# Patient Record
Sex: FEMALE | Race: WHITE | NOT HISPANIC OR LATINO | Employment: OTHER
[De-identification: names, ages, dates, MRNs, and addresses within clinical notes are randomized per-mention and may not be internally consistent; named-entity substitution may affect disease eponyms.]

---

## 2017-09-24 ENCOUNTER — HEALTH MAINTENANCE LETTER (OUTPATIENT)
Age: 67
End: 2017-09-24

## 2019-11-09 ENCOUNTER — HEALTH MAINTENANCE LETTER (OUTPATIENT)
Age: 69
End: 2019-11-09

## 2020-02-23 ENCOUNTER — HEALTH MAINTENANCE LETTER (OUTPATIENT)
Age: 70
End: 2020-02-23

## 2020-12-06 ENCOUNTER — HEALTH MAINTENANCE LETTER (OUTPATIENT)
Age: 70
End: 2020-12-06

## 2021-04-11 ENCOUNTER — HEALTH MAINTENANCE LETTER (OUTPATIENT)
Age: 71
End: 2021-04-11

## 2021-09-26 ENCOUNTER — HEALTH MAINTENANCE LETTER (OUTPATIENT)
Age: 71
End: 2021-09-26

## 2021-11-21 ENCOUNTER — HEALTH MAINTENANCE LETTER (OUTPATIENT)
Age: 71
End: 2021-11-21

## 2022-05-07 ENCOUNTER — HEALTH MAINTENANCE LETTER (OUTPATIENT)
Age: 72
End: 2022-05-07

## 2023-06-02 ENCOUNTER — HEALTH MAINTENANCE LETTER (OUTPATIENT)
Age: 73
End: 2023-06-02

## 2023-12-02 ENCOUNTER — HEALTH MAINTENANCE LETTER (OUTPATIENT)
Age: 73
End: 2023-12-02

## 2025-01-21 ENCOUNTER — APPOINTMENT (OUTPATIENT)
Dept: URBAN - METROPOLITAN AREA CLINIC 252 | Age: 75
Setting detail: DERMATOLOGY
End: 2025-01-21

## 2025-01-21 VITALS — RESPIRATION RATE: 16 BRPM | HEIGHT: 66.5 IN | WEIGHT: 286.6 LBS

## 2025-01-21 DIAGNOSIS — L81.4 OTHER MELANIN HYPERPIGMENTATION: ICD-10-CM

## 2025-01-21 DIAGNOSIS — L57.0 ACTINIC KERATOSIS: ICD-10-CM

## 2025-01-21 PROCEDURE — 99202 OFFICE O/P NEW SF 15 MIN: CPT | Mod: 25

## 2025-01-21 PROCEDURE — 17000 DESTRUCT PREMALG LESION: CPT

## 2025-01-21 PROCEDURE — OTHER COUNSELING: OTHER

## 2025-01-21 PROCEDURE — OTHER LIQUID NITROGEN: OTHER

## 2025-01-21 ASSESSMENT — LOCATION SIMPLE DESCRIPTION DERM
LOCATION SIMPLE: LEFT CHEEK
LOCATION SIMPLE: LEFT FOREARM
LOCATION SIMPLE: LEFT CHEEK

## 2025-01-21 ASSESSMENT — LOCATION ZONE DERM
LOCATION ZONE: ARM
LOCATION ZONE: FACE
LOCATION ZONE: FACE

## 2025-01-21 ASSESSMENT — LOCATION DETAILED DESCRIPTION DERM
LOCATION DETAILED: LEFT DISTAL DORSAL FOREARM
LOCATION DETAILED: LEFT INFERIOR CENTRAL MALAR CHEEK
LOCATION DETAILED: LEFT INFERIOR CENTRAL MALAR CHEEK

## 2025-01-21 NOTE — PROCEDURE: LIQUID NITROGEN
Detail Level: Detailed
Consent: - Discussed that these are a result of cumulative sun exposure.\\n- Consent was obtained and risks were reviewed prior to procedure today. All questions were answered prior to procedure today.\\n- Risks discussed include but are not limited to pain, crusting, scabbing, blistering, scarring, temporary or permanent darker or lighter pigmentary change, recurrence, incomplete resolution, and infection.
Duration Of Freeze Thaw-Cycle (Seconds): 0
Show Applicator Variable?: Yes
Show Aperture Variable?: No
Post-Care Instructions: - Patient was instructed to avoid picking at any of the treated lesions.
Application Tool (Optional): Liquid Nitrogen Sprayer

## 2025-01-21 NOTE — PROCEDURE: COUNSELING
Patient Specific Counseling (Will Not Stick From Patient To Patient): **discussed PDT- handout given today.
Detail Level: Detailed
Patient Specific Counseling (Will Not Stick From Patient To Patient): **discussed removal and pricing- pt will schedule a separate appt.
Detail Level: Zone

## 2025-02-18 ENCOUNTER — APPOINTMENT (OUTPATIENT)
Dept: URBAN - METROPOLITAN AREA CLINIC 252 | Age: 75
Setting detail: DERMATOLOGY
End: 2025-02-21

## 2025-02-18 DIAGNOSIS — L57.0 ACTINIC KERATOSIS: ICD-10-CM

## 2025-02-18 PROCEDURE — OTHER PDT: BLUE: OTHER

## 2025-02-18 PROCEDURE — 96574 DBRDMT PRMLG LES W/PDT: CPT

## 2025-02-18 ASSESSMENT — LOCATION SIMPLE DESCRIPTION DERM: LOCATION SIMPLE: INFERIOR FOREHEAD

## 2025-02-18 ASSESSMENT — LOCATION DETAILED DESCRIPTION DERM: LOCATION DETAILED: INFERIOR MID FOREHEAD

## 2025-02-18 ASSESSMENT — LOCATION ZONE DERM: LOCATION ZONE: FACE

## 2025-02-18 NOTE — PROCEDURE: PDT: BLUE
Light Source: Lance-U
Treatment Number: 1
Occlusion: No
Total Number Of Aks Treated (Optional To Report): 0
Eye Protection Applied?: Yes
Pre-Procedure Text: - The treatment areas were cleaned and prepped with rubbing alcohol prior to application of Ameluze.
Detail Level: Zone
Who Performed The Pdt (Provider): Radha Rivera
Ndc# (Optional): 71380-859-38
Incubation Time: 02:00:00
Anesthesia Type: 1% lidocaine with epinephrine
Medical Necessity: Precancerous Lesions
Consent: - After in in-depth discussion of the risks and expectations of pain, redness, scabbing, burning, blistering, post-inflammatory pigmentation, recurrence, incomplete resolution of lesions, and the remote possibility of scarring  the patient made an informed decision to move forward to have the treatment today and consented to the procedure.\\n- The Lance-U light source was turned on by provider.
Who Performed The Pdt?: Performed by MD JOHNATHAN, JACEK or MANUEL with Pre-Procedure Debridement of Hyperkeratotic Lesions (38211)
Post-Care Instructions: - I reviewed with the patient in detail post-care instructions. Patient is to avoid sunlight for the next 2 days, and wear sun protection. \\n- Patients should expect sunburn like redness, discomfort and scabbing.
Debridement Text (Will Only Render In Visit Note If You Select Debridement Option Under Who Performed The Pdt Field): - Prior to application of the photodynamic medication the hyperkeratotic lesions were curetted by provider to make them more amenable to therapy.
Which Photosensitizer Was Used: Ameluz
Illumination Time: 00:16:40
Show Inventory Tab: Hide

## 2025-03-17 ENCOUNTER — APPOINTMENT (OUTPATIENT)
Dept: URBAN - METROPOLITAN AREA CLINIC 252 | Age: 75
Setting detail: DERMATOLOGY
End: 2025-03-17

## 2025-03-17 DIAGNOSIS — L57.0 ACTINIC KERATOSIS: ICD-10-CM

## 2025-03-17 DIAGNOSIS — L81.4 OTHER MELANIN HYPERPIGMENTATION: ICD-10-CM

## 2025-03-17 PROCEDURE — OTHER COUNSELING: OTHER

## 2025-03-17 PROCEDURE — OTHER LIQUID NITROGEN (COSMETIC): OTHER

## 2025-03-17 PROCEDURE — 17000 DESTRUCT PREMALG LESION: CPT

## 2025-03-17 PROCEDURE — OTHER PRESCRIPTION: OTHER

## 2025-03-17 PROCEDURE — OTHER LIQUID NITROGEN: OTHER

## 2025-03-17 RX ORDER — TRETINOIN 0.25 MG/G
CREAM TOPICAL QD
Qty: 45 | Refills: 3 | Status: ERX | COMMUNITY
Start: 2025-03-17

## 2025-03-17 ASSESSMENT — LOCATION DETAILED DESCRIPTION DERM
LOCATION DETAILED: LEFT CENTRAL LATERAL NECK
LOCATION DETAILED: LEFT DISTAL RADIAL DORSAL FOREARM
LOCATION DETAILED: RIGHT PROXIMAL DORSAL FOREARM
LOCATION DETAILED: LEFT SUPERIOR MEDIAL FOREHEAD
LOCATION DETAILED: RIGHT PROXIMAL RADIAL DORSAL FOREARM
LOCATION DETAILED: LEFT INFERIOR LATERAL NECK
LOCATION DETAILED: LEFT PROXIMAL DORSAL FOREARM
LOCATION DETAILED: RIGHT SUPERIOR FOREHEAD
LOCATION DETAILED: LEFT CENTRAL MALAR CHEEK
LOCATION DETAILED: LEFT DORSAL WRIST
LOCATION DETAILED: LEFT SUPERIOR LATERAL BUCCAL CHEEK
LOCATION DETAILED: LEFT INFERIOR CENTRAL MALAR CHEEK
LOCATION DETAILED: RIGHT CENTRAL ZYGOMA

## 2025-03-17 ASSESSMENT — LOCATION SIMPLE DESCRIPTION DERM
LOCATION SIMPLE: RIGHT FOREHEAD
LOCATION SIMPLE: LEFT WRIST
LOCATION SIMPLE: RIGHT ZYGOMA
LOCATION SIMPLE: LEFT CHEEK
LOCATION SIMPLE: LEFT FOREHEAD
LOCATION SIMPLE: NECK
LOCATION SIMPLE: LEFT FOREARM
LOCATION SIMPLE: RIGHT FOREARM

## 2025-03-17 ASSESSMENT — LOCATION ZONE DERM
LOCATION ZONE: ARM
LOCATION ZONE: NECK
LOCATION ZONE: FACE

## 2025-03-17 NOTE — PROCEDURE: COUNSELING
Detail Level: Detailed
Patient Specific Counseling (Will Not Stick From Patient To Patient): If AK on left cheek not resolved after LN2 treatment today would consider biopsy. \\nDiscussed repeating PDT next fall. Would plan to incubate 4 hours instead of 2 hours
Patient Specific Counseling (Will Not Stick From Patient To Patient): Recommended vitamin c and sunscreen \\nDiscussed cosmetic treatments

## 2025-03-17 NOTE — HPI: SKIN LESION (ACTINIC KERATOSES)
Is This A New Presentation, Or A Follow-Up?: Follow Up Actinic Keratoses
Additional History: 1 Month post PDT. To states improvement

## 2025-03-17 NOTE — PROCEDURE: LIQUID NITROGEN
Post-Care Instructions: I reviewed with the patient in detail post-care instructions. Patient is to wear sunprotection, and avoid picking at any of the treated lesions. Pt may apply Vaseline to crusted or scabbing areas.
Render In Bullet Format When Appropriate: No
Detail Level: Detailed
Consent: The patient's consent was obtained including but not limited to risks of crusting, scabbing, blistering, scarring, darker or lighter pigmentary change, recurrence, incomplete removal and infection.
Duration Of Freeze Thaw-Cycle (Seconds): 0

## 2025-03-17 NOTE — PROCEDURE: LIQUID NITROGEN (COSMETIC)
Detail Level: Detailed
Consent: The patient's consent was obtained including but not limited to risks of crusting, scabbing, blistering, scarring, darker or lighter pigmentary change, recurrence, incomplete removal and infection. The patient understands that the procedure is cosmetic in nature and is not covered by insurance.
Spray Paint Text: The liquid nitrogen was applied to the skin utilizing a spray paint frosting technique.
Show Spray Paint Technique Variable?: Yes
Price (Use Numbers Only, No Special Characters Or $): 200
Render Post-Care Instructions In Note?: no
Billing Information: Bill by Static Price
Post-Care Instructions: I reviewed with the patient in detail post-care instructions. Patient is to wear sunprotection, and avoid picking at any of the treated lesions. Pt may apply Vaseline to crusted or scabbing areas.